# Patient Record
Sex: FEMALE | Race: WHITE
[De-identification: names, ages, dates, MRNs, and addresses within clinical notes are randomized per-mention and may not be internally consistent; named-entity substitution may affect disease eponyms.]

---

## 2020-01-08 ENCOUNTER — HOSPITAL ENCOUNTER (OUTPATIENT)
Dept: HOSPITAL 47 - ORWHC2ENDO | Age: 35
Discharge: HOME | End: 2020-01-08
Attending: INTERNAL MEDICINE
Payer: COMMERCIAL

## 2020-01-08 VITALS — SYSTOLIC BLOOD PRESSURE: 103 MMHG | RESPIRATION RATE: 16 BRPM | HEART RATE: 47 BPM | DIASTOLIC BLOOD PRESSURE: 68 MMHG

## 2020-01-08 VITALS — BODY MASS INDEX: 28.3 KG/M2

## 2020-01-08 VITALS — TEMPERATURE: 99 F

## 2020-01-08 DIAGNOSIS — F31.9: ICD-10-CM

## 2020-01-08 DIAGNOSIS — F41.9: ICD-10-CM

## 2020-01-08 DIAGNOSIS — Z87.440: ICD-10-CM

## 2020-01-08 DIAGNOSIS — Z91.09: ICD-10-CM

## 2020-01-08 DIAGNOSIS — K62.5: Primary | ICD-10-CM

## 2020-01-08 DIAGNOSIS — Z87.891: ICD-10-CM

## 2020-01-08 PROCEDURE — 45378 DIAGNOSTIC COLONOSCOPY: CPT

## 2020-01-08 PROCEDURE — 81025 URINE PREGNANCY TEST: CPT

## 2020-01-08 NOTE — P.PCN
Date of Procedure: 01/08/20


Procedure(s) Performed: 


BRIEF HISTORY: Patient is a 34-year-old pleasant male, scheduled for an elective

colonoscopy as a part of evaluation of lower abdominal pain and change in bowel 

habits with intermittent rectal bleeding.





PROCEDURE PERFORMED: Colonoscopy. 





PREOPERATIVE DIAGNOSIS: Lower abdominal Pain/intermittent rectal bleeding and 

change in bowel habits. 





IV sedation per Anesthesia. 





PROCEDURE: After informed consent was obtained, the patient, was brought into 

the endoscopy unit. IV sedation was administered by Anesthesia under continuous 

monitoring.  Digital rectal examination was normal. Initially the Olympus CF-160

flexible video colonoscope was then inserted in the rectum, gradually advanced 

into the cecum without any difficulty. Careful examination was performed as the 

scope was gradually being withdrawn. Ileocecal valve and the appendiceal orifice

were visualized and appeared normal.  Prep was excellent. Mucosa of the cecum, 

ascending colon, transverse colon, descending colon, sigmoid colon, and rectum 

appeared normal. Retroflexion was performed in the rectum and no lesions were 

seen. The patient tolerated the procedure well. 





IMPRESSION: Normal-appearing colon from rectum to cecum .





RECOMMENDATIONS:  Findings of this examination were discussed with the patient 

as well as a family.  She was advised to be a high-fiber diet and fiber 

supplements a regular basis..

## 2020-10-15 ENCOUNTER — HOSPITAL ENCOUNTER (OUTPATIENT)
Dept: HOSPITAL 47 - LABWHC1 | Age: 35
Discharge: HOME | End: 2020-10-15
Attending: INTERNAL MEDICINE
Payer: COMMERCIAL

## 2020-10-15 DIAGNOSIS — Z20.828: Primary | ICD-10-CM
